# Patient Record
Sex: FEMALE | Race: AMERICAN INDIAN OR ALASKA NATIVE | NOT HISPANIC OR LATINO | URBAN - NONMETROPOLITAN AREA
[De-identification: names, ages, dates, MRNs, and addresses within clinical notes are randomized per-mention and may not be internally consistent; named-entity substitution may affect disease eponyms.]

---

## 2024-01-19 ENCOUNTER — HOSPITAL ENCOUNTER (EMERGENCY)
Facility: HOSPITAL | Age: 19
Discharge: HOME | End: 2024-01-19

## 2024-01-19 VITALS
HEART RATE: 100 BPM | RESPIRATION RATE: 17 BRPM | HEIGHT: 65 IN | TEMPERATURE: 98 F | OXYGEN SATURATION: 99 % | DIASTOLIC BLOOD PRESSURE: 97 MMHG | SYSTOLIC BLOOD PRESSURE: 138 MMHG

## 2024-01-19 DIAGNOSIS — M79.641 PAIN OF RIGHT HAND: ICD-10-CM
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
## 2024-01-19 DIAGNOSIS — S69.91XA INJURY OF FINGER OF RIGHT HAND, INITIAL ENCOUNTER: ICD-10-CM

## 2024-01-19 PROCEDURE — 99283 EMERGENCY DEPT VISIT LOW MDM: CPT

## 2024-01-19 PROCEDURE — 25000003 PHARM REV CODE 250: Performed by: REGISTERED NURSE

## 2024-01-19 PROCEDURE — 99283 EMERGENCY DEPT VISIT LOW MDM: CPT | Mod: ,,, | Performed by: REGISTERED NURSE

## 2024-01-19 RX ORDER — ACETAMINOPHEN 500 MG
1000 TABLET ORAL
Status: COMPLETED | OUTPATIENT
Start: 2024-01-19 | End: 2024-01-19

## 2024-01-19 RX ADMIN — ACETAMINOPHEN 1000 MG: 500 TABLET ORAL at 06:01

## 2024-01-19 NOTE — Clinical Note
"Doe Delgadillogio Brandon was seen and treated in our emergency department on 1/19/2024.  She may return to work on 01/20/2024.       If you have any questions or concerns, please don't hesitate to call.      Danni Medina, NP-C"
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
## 2024-01-19 NOTE — DISCHARGE INSTRUCTIONS
-Tylenol every 4 hours or ibuprofen every 6 hours as needed for pain or discomfort  -Ice to area as directed in discharge instructions  -Open and close right hand often while awake to prevent stiffness  -Follow up with your regular provider next week if symptoms fail to improve.

## 2024-12-25 ENCOUNTER — HOSPITAL ENCOUNTER (EMERGENCY)
Facility: HOSPITAL | Age: 19
Discharge: HOME | End: 2024-12-25
Payer: OTHER GOVERNMENT

## 2024-12-25 VITALS
SYSTOLIC BLOOD PRESSURE: 132 MMHG | WEIGHT: 293 LBS | OXYGEN SATURATION: 95 % | HEART RATE: 103 BPM | TEMPERATURE: 100 F | BODY MASS INDEX: 48.82 KG/M2 | HEIGHT: 65 IN | DIASTOLIC BLOOD PRESSURE: 95 MMHG | RESPIRATION RATE: 20 BRPM

## 2024-12-25 DIAGNOSIS — J10.1 INFLUENZA A: Primary | ICD-10-CM

## 2024-12-25 DIAGNOSIS — R05.9 COUGH: ICD-10-CM

## 2024-12-25 LAB
GROUP A STREP MOLECULAR (OHS): NEGATIVE
HCG UR QL IA.RAPID: NEGATIVE

## 2024-12-25 PROCEDURE — 81025 URINE PREGNANCY TEST: CPT | Performed by: NURSE PRACTITIONER

## 2024-12-25 PROCEDURE — 87651 STREP A DNA AMP PROBE: CPT | Performed by: NURSE PRACTITIONER

## 2024-12-25 PROCEDURE — 99283 EMERGENCY DEPT VISIT LOW MDM: CPT | Mod: 25

## 2024-12-25 PROCEDURE — 99284 EMERGENCY DEPT VISIT MOD MDM: CPT | Mod: ,,, | Performed by: NURSE PRACTITIONER

## 2024-12-25 RX ORDER — ACETAMINOPHEN 500 MG
500 TABLET ORAL EVERY 6 HOURS PRN
COMMUNITY

## 2024-12-25 RX ORDER — IBUPROFEN 200 MG
200 TABLET ORAL EVERY 6 HOURS PRN
COMMUNITY

## 2024-12-26 ENCOUNTER — TELEPHONE (OUTPATIENT)
Dept: EMERGENCY MEDICINE | Facility: HOSPITAL | Age: 19
End: 2024-12-26
Payer: OTHER GOVERNMENT

## 2024-12-26 NOTE — ED PROVIDER NOTES
"Encounter Date: 12/25/2024       History     Chief Complaint   Patient presents with    Influenza     Sore throat, vomiting x 4 today, fever and cough x 1 week.  Was diagnosed with "flu" yesterday at [facility]     18 y/o female arrived to the ED via POV with complaint of  sore throat and fever x 1 weeks. Was diagnosed with flu on Monday at [facility]. Was not given prescription for Tamiflu at [facility] due to being over 48 hours from onset of symptoms.  Had nausea with vomiting today x 4 and productive cough. Denies SOB, wheezing or diarrhea.    The history is provided by the patient.     Review of patient's allergies indicates:  No Known Allergies  History reviewed. No pertinent past medical history.  Past Surgical History:   Procedure Laterality Date    ANKLE FRACTURE SURGERY       No family history on file.  Social History     Tobacco Use    Smoking status: Never    Smokeless tobacco: Never   Substance Use Topics    Alcohol use: Never    Drug use: Never     Review of Systems   Constitutional:  Positive for activity change, appetite change, chills, fatigue and fever.   HENT:  Positive for congestion, ear pain and sore throat. Negative for ear discharge, postnasal drip, rhinorrhea, sinus pressure, sinus pain and trouble swallowing.    Eyes:  Negative for photophobia, pain and redness.   Respiratory:  Positive for cough. Negative for shortness of breath and wheezing.    Cardiovascular: Negative.    Gastrointestinal:  Positive for nausea and vomiting. Negative for abdominal pain and diarrhea.   Genitourinary:  Negative for dysuria, flank pain and frequency.   Musculoskeletal: Negative.    Skin: Negative.    Neurological:  Positive for headaches. Negative for dizziness, speech difficulty, weakness and light-headedness.   Hematological: Negative.    Psychiatric/Behavioral: Negative.         Physical Exam     Initial Vitals [12/25/24 2045]   BP Pulse Resp Temp SpO2   (!) 132/95 103 20 99.6 °F (37.6 °C) 95 %      MAP     "   --         Physical Exam    Nursing note and vitals reviewed.  Constitutional: Vital signs are normal. She appears well-developed and well-nourished. She is Obese . She is cooperative.  Non-toxic appearance. She does not have a sickly appearance. She appears ill. No distress.   HENT:   Head: Normocephalic and atraumatic.   Right Ear: Tympanic membrane, external ear and ear canal normal.   Left Ear: Tympanic membrane, external ear and ear canal normal.   Nose: Right sinus exhibits no maxillary sinus tenderness and no frontal sinus tenderness. Left sinus exhibits no maxillary sinus tenderness and no frontal sinus tenderness. Mouth/Throat: Uvula is midline and mucous membranes are normal. Posterior oropharyngeal erythema present. No oropharyngeal exudate, posterior oropharyngeal edema or tonsillar abscesses.   Eyes: Conjunctivae are normal. Pupils are equal, round, and reactive to light.   Neck: Neck supple.   Normal range of motion.  Cardiovascular:  Normal rate, regular rhythm, normal heart sounds and intact distal pulses.           Pulmonary/Chest: Effort normal and breath sounds normal. No respiratory distress.   Abdominal: Abdomen is soft. There is no abdominal tenderness.   Musculoskeletal:         General: Normal range of motion.      Cervical back: Normal range of motion and neck supple.     Neurological: She is alert and oriented to person, place, and time.   Skin: Skin is warm, dry and intact. Capillary refill takes less than 2 seconds. No rash noted.   Psychiatric: She has a normal mood and affect. Her speech is normal and behavior is normal.         Medical Screening Exam   See Full Note    ED Course   Procedures  Labs Reviewed   STREP A BY MOLECULAR METHOD - Normal       Result Value    Group A Strep Molecular Negative     HCG QUALITATIVE URINE - Normal    HCG Qualitative, Urine Negative            Imaging Results              X-Ray Chest PA And Lateral (Final result)  Result time 12/25/24 22:21:55       Final result by Gamaliel Solorio MD (12/25/24 22:21:55)                   Impression:      No acute abnormality.      Electronically signed by: Gamaliel Solorio  Date:    12/25/2024  Time:    22:21               Narrative:    EXAMINATION:  XR CHEST PA AND LATERAL    CLINICAL HISTORY:  Cough, unspecified    TECHNIQUE:  PA and lateral views of the chest were performed.    COMPARISON:  None    FINDINGS:  The lungs are clear, with normal appearance of pulmonary vasculature and no pleural effusion or pneumothorax.    The cardiac silhouette is normal in size. The hilar and mediastinal contours are unremarkable.    Bones are intact.                                       Medications - No data to display  Medical Decision Making  18 y/o female arrived to the ED via POV with complaint of  sore throat and fever x 1 weeks. Was diagnosed with flu on Monday at [facility]. Was not given prescription for Tamiflu at [facility] due to being over 48 hours from onset of symptoms.  Had nausea with vomiting today x 4 and productive cough. Denies SOB, wheezing or diarrhea.    Problems Addressed:  Cough:     Details: CXR negative. May take Robitussin DM OTC as directed.  Influenza A:     Details: Tested positive for flu on 12/23. Strep swab today was negative. Alternate Motrin and Tylenol fever. Increase fluids and rest. Detailed return precautions discussed. Patient agreed to treatment plan and verbalized understanding.    Amount and/or Complexity of Data Reviewed  Labs: ordered. Decision-making details documented in ED Course.  Radiology: ordered. Decision-making details documented in ED Course.                                      Clinical Impression:   Final diagnoses:  [R05.9] Cough  [J10.1] Influenza A (Primary)               Callie Campos, [facility]  12/25/24 2747

## 2024-12-26 NOTE — DISCHARGE INSTRUCTIONS
-Continue to Alternate Motrin and Tylenol for fever  -Increase fluids and rest.  -Take Robitussin DM over the counter as directed for cough and congestion. Do not take Vicks DayQuil while taking Robitussin DM.